# Patient Record
Sex: MALE | Race: WHITE | NOT HISPANIC OR LATINO | Employment: FULL TIME | URBAN - METROPOLITAN AREA
[De-identification: names, ages, dates, MRNs, and addresses within clinical notes are randomized per-mention and may not be internally consistent; named-entity substitution may affect disease eponyms.]

---

## 2020-08-24 ENCOUNTER — OFFICE VISIT (OUTPATIENT)
Dept: DERMATOLOGY | Facility: CLINIC | Age: 65
End: 2020-08-24
Payer: MEDICARE

## 2020-08-24 DIAGNOSIS — L91.8 SKIN TAG: ICD-10-CM

## 2020-08-24 DIAGNOSIS — H01.136 ECZEMA OF LEFT EYELID: ICD-10-CM

## 2020-08-24 DIAGNOSIS — L82.1 SK (SEBORRHEIC KERATOSIS): ICD-10-CM

## 2020-08-24 DIAGNOSIS — L57.0 AK (ACTINIC KERATOSIS): Primary | ICD-10-CM

## 2020-08-24 PROCEDURE — 99202 PR OFFICE/OUTPT VISIT, NEW, LEVL II, 15-29 MIN: ICD-10-PCS | Mod: 25,S$GLB,, | Performed by: DERMATOLOGY

## 2020-08-24 PROCEDURE — 99999 PR PBB SHADOW E&M-NEW PATIENT-LVL III: CPT | Mod: PBBFAC,,, | Performed by: DERMATOLOGY

## 2020-08-24 PROCEDURE — 17004 DESTROY PREMAL LESIONS 15/>: CPT | Mod: S$GLB,,, | Performed by: DERMATOLOGY

## 2020-08-24 PROCEDURE — 1101F PR PT FALLS ASSESS DOC 0-1 FALLS W/OUT INJ PAST YR: ICD-10-PCS | Mod: CPTII,S$GLB,, | Performed by: DERMATOLOGY

## 2020-08-24 PROCEDURE — 99202 OFFICE O/P NEW SF 15 MIN: CPT | Mod: 25,S$GLB,, | Performed by: DERMATOLOGY

## 2020-08-24 PROCEDURE — 17004 PR DESTRUCTION, PREMALIGNANT LESIONS; 15 OR MORE LESIONS: ICD-10-PCS | Mod: S$GLB,,, | Performed by: DERMATOLOGY

## 2020-08-24 PROCEDURE — 1159F PR MEDICATION LIST DOCUMENTED IN MEDICAL RECORD: ICD-10-PCS | Mod: S$GLB,,, | Performed by: DERMATOLOGY

## 2020-08-24 PROCEDURE — 1126F PR PAIN SEVERITY QUANTIFIED, NO PAIN PRESENT: ICD-10-PCS | Mod: S$GLB,,, | Performed by: DERMATOLOGY

## 2020-08-24 PROCEDURE — 1126F AMNT PAIN NOTED NONE PRSNT: CPT | Mod: S$GLB,,, | Performed by: DERMATOLOGY

## 2020-08-24 PROCEDURE — 1159F MED LIST DOCD IN RCRD: CPT | Mod: S$GLB,,, | Performed by: DERMATOLOGY

## 2020-08-24 PROCEDURE — 99999 PR PBB SHADOW E&M-NEW PATIENT-LVL III: ICD-10-PCS | Mod: PBBFAC,,, | Performed by: DERMATOLOGY

## 2020-08-24 PROCEDURE — 1101F PT FALLS ASSESS-DOCD LE1/YR: CPT | Mod: CPTII,S$GLB,, | Performed by: DERMATOLOGY

## 2020-08-24 RX ORDER — PRAVASTATIN SODIUM 80 MG/1
TABLET ORAL
COMMUNITY
Start: 2020-06-04

## 2020-08-24 RX ORDER — ASPIRIN 81 MG/1
81 TABLET ORAL DAILY
COMMUNITY

## 2020-08-24 RX ORDER — METOPROLOL SUCCINATE 25 MG/1
TABLET, EXTENDED RELEASE ORAL
COMMUNITY
Start: 2020-06-04

## 2020-08-24 RX ORDER — LOSARTAN POTASSIUM 50 MG/1
TABLET ORAL
COMMUNITY
Start: 2020-07-24

## 2020-08-24 NOTE — PROGRESS NOTES
Subjective:       Patient ID:  Nixon Jose Jr. is a 64 y.o. male who presents for   Chief Complaint   Patient presents with    Skin Check     eye, x weeks, itchy, growing, tx OTC cream arms, x months, no healing, no tx     Dry spot under l eye for one month.  Suddenly appeared.    Also bump on the left arm for months.    Has dry spots on the arms for years.  No prev tx.      Review of Systems   Constitutional: Negative for fever, chills, fatigue and malaise.   HENT: Negative for congestion and sore throat.    Respiratory: Negative for cough and shortness of breath.    Skin: Positive for itching, rash and dry skin.        Objective:    Physical Exam   Constitutional: He appears well-developed and well-nourished. No distress.   Eyes: No conjunctival no injection.   Neurological: He is alert and oriented to person, place, and time. He is not disoriented.   Psychiatric: He has a normal mood and affect.   Skin:   Areas Examined (abnormalities noted in diagram):   Scalp / Hair Palpated and Inspected  Head / Face Inspection Performed  Neck Inspection Performed  RUE Inspected  LUE Inspection Performed                   Diagram Legend     Erythematous scaling macule/papule c/w actinic keratosis       Vascular papule c/w angioma      Pigmented verrucoid papule/plaque c/w seborrheic keratosis      Yellow umbilicated papule c/w sebaceous hyperplasia      Irregularly shaped tan macule c/w lentigo     1-2 mm smooth white papules consistent with Milia      Movable subcutaneous cyst with punctum c/w epidermal inclusion cyst      Subcutaneous movable cyst c/w pilar cyst      Firm pink to brown papule c/w dermatofibroma      Pedunculated fleshy papule(s) c/w skin tag(s)      Evenly pigmented macule c/w junctional nevus     Mildly variegated pigmented, slightly irregular-bordered macule c/w mildly atypical nevus      Flesh colored to evenly pigmented papule c/w intradermal nevus       Pink pearly papule/plaque c/w basal cell  carcinoma      Erythematous hyperkeratotic cursted plaque c/w SCC      Surgical scar with no sign of skin cancer recurrence      Open and closed comedones      Inflammatory papules and pustules      Verrucoid papule consistent consistent with wart     Erythematous eczematous patches and plaques     Dystrophic onycholytic nail with subungual debris c/w onychomycosis     Umbilicated papule    Erythematous-base heme-crusted tan verrucoid plaque consistent with inflamed seborrheic keratosis     Erythematous Silvery Scaling Plaque c/w Psoriasis     See annotation      Assessment / Plan:        AK (actinic keratosis)  Discussed all of the following:  Actinic keratosis is a skin growth caused by sun damage. These growths are not cancer, but they may develop into skin cancer (precancerous). Many people get these growths, especially as they age. This is because of cumulative sun exposure over years. Multiple growths are called actinic keratoses.    Brochure given for patient education.  Cryosurgery Procedure Note    Verbal consent from the patient is obtained including, but not limited to, risk of hypopigmentation/hyperpigmentation, scar, recurrence of lesion. The patient is aware of the precancerous quality and need for treatment of these lesions. Liquid nitrogen cryosurgery is applied to the 16 actinic keratoses, as detailed in the physical exam, to produce a freeze injury. The patient is aware that blisters may form and is instructed on wound care with gentle cleansing and use of vaseline ointment to keep moist until healed. The patient is supplied a handout on cryosurgery and is instructed to call if lesions do not completely resolve.    Eczema of left eyelid  Discussed with patient the etiology and pathogenesis of the disease or skin lesion(s) and possible treatments and aggravators.    Reviewed with patient different treatment options and associated risks.  Recommended the usage of over the counter hydrocortisone as  needed for itching.  Instructed patient to use petroleum jelly at least daily on affected areas.  No hot water bathing reviewed.  We will recheck the l lower eyelid at our follow up appointment.  Make sure not ak.      SK (seborrheic keratosis)  Discussed with patient the benign nature of these lesions and that no treatment is indicated.      Skin tag  Discussed with patient the benign nature of these lesions and that no treatment is indicated.               Follow up in about 6 weeks (around 10/5/2020).

## 2020-08-24 NOTE — PATIENT INSTRUCTIONS
Sun damage spots that can lead to skin cancer  OTC hydrocortisone under the eye twice a day under both eyes for 1 week then stop, use along with Vaseline twice a day. Continue the use of Vaseline twice daily.  Recheck arms in 6 wks.    CRYOSURGERY      Your doctor has used a method called cryosurgery to treat your skin condition. Cryosurgery refers to the use of very cold substances to treat a variety of skin conditions such as warts, pre-skin cancers, molluscum contagiosum, sun spots, and several benign growths. The substance we use in cryosurgery is liquid nitrogen and is so cold (-195 degrees Celsius) that is burns when administered.     Following treatment in the office, the skin may immediately burn and become red. You may find the area around the lesion is affected as well. It is sometimes necessary to treat not only the lesion, but a small area of the surrounding normal skin to achieve a good response.     A blister, and even a blood filled blister, may form after treatment.   This is a normal response. If the blister is painful, it is acceptable to sterilize a needle and with rubbing alcohol and gently pop the blister. It is important that you gently wash the area with soap and warm water as the blister fluid may contain wart virus if a wart was treated. Do no remove the roof of the blister.     The area treated can take anywhere from 1-3 weeks to heal. Healing time depends on the kind skin lesion treated, the location, and how aggressively the lesion was treated. It is recommended that the areas treated are covered with Vaseline or bacitracin ointment and a band-aid. If a band-aid is not practical, just ointment applied several times per day will do. Keeping these areas moist will speed the healing time.    Treatment with liquid nitrogen can leave a scar. In dark skin, it may be a light or dark scar, in light skin it may be a white or pink scar. These will generally fade with time, but may never go away  completely.     If you have any concerns after your treatment, please feel free to call the office.         [address], [address] [address]  Dept: [phone]

## 2020-10-05 ENCOUNTER — OFFICE VISIT (OUTPATIENT)
Dept: DERMATOLOGY | Facility: CLINIC | Age: 65
End: 2020-10-05
Payer: MEDICARE

## 2020-10-05 DIAGNOSIS — L82.1 SEBORRHEIC KERATOSES: ICD-10-CM

## 2020-10-05 DIAGNOSIS — L57.0 ACTINIC KERATOSES: Primary | ICD-10-CM

## 2020-10-05 DIAGNOSIS — D22.9 MULTIPLE BENIGN NEVI: ICD-10-CM

## 2020-10-05 PROCEDURE — 17003 DESTRUCTION, PREMALIGNANT LESIONS; SECOND THROUGH 14 LESIONS: ICD-10-PCS | Mod: S$GLB,,, | Performed by: DERMATOLOGY

## 2020-10-05 PROCEDURE — 1159F MED LIST DOCD IN RCRD: CPT | Mod: S$GLB,,, | Performed by: DERMATOLOGY

## 2020-10-05 PROCEDURE — 99213 OFFICE O/P EST LOW 20 MIN: CPT | Mod: 25,S$GLB,, | Performed by: DERMATOLOGY

## 2020-10-05 PROCEDURE — 1159F PR MEDICATION LIST DOCUMENTED IN MEDICAL RECORD: ICD-10-PCS | Mod: S$GLB,,, | Performed by: DERMATOLOGY

## 2020-10-05 PROCEDURE — 99213 PR OFFICE/OUTPT VISIT, EST, LEVL III, 20-29 MIN: ICD-10-PCS | Mod: 25,S$GLB,, | Performed by: DERMATOLOGY

## 2020-10-05 PROCEDURE — 99999 PR PBB SHADOW E&M-EST. PATIENT-LVL III: ICD-10-PCS | Mod: PBBFAC,,, | Performed by: DERMATOLOGY

## 2020-10-05 PROCEDURE — 17000 PR DESTRUCTION(LASER SURGERY,CRYOSURGERY,CHEMOSURGERY),PREMALIGNANT LESIONS,FIRST LESION: ICD-10-PCS | Mod: S$GLB,,, | Performed by: DERMATOLOGY

## 2020-10-05 PROCEDURE — 17003 DESTRUCT PREMALG LES 2-14: CPT | Mod: S$GLB,,, | Performed by: DERMATOLOGY

## 2020-10-05 PROCEDURE — 17000 DESTRUCT PREMALG LESION: CPT | Mod: S$GLB,,, | Performed by: DERMATOLOGY

## 2020-10-05 PROCEDURE — 1126F AMNT PAIN NOTED NONE PRSNT: CPT | Mod: S$GLB,,, | Performed by: DERMATOLOGY

## 2020-10-05 PROCEDURE — 1101F PT FALLS ASSESS-DOCD LE1/YR: CPT | Mod: CPTII,S$GLB,, | Performed by: DERMATOLOGY

## 2020-10-05 PROCEDURE — 99999 PR PBB SHADOW E&M-EST. PATIENT-LVL III: CPT | Mod: PBBFAC,,, | Performed by: DERMATOLOGY

## 2020-10-05 PROCEDURE — 1101F PR PT FALLS ASSESS DOC 0-1 FALLS W/OUT INJ PAST YR: ICD-10-PCS | Mod: CPTII,S$GLB,, | Performed by: DERMATOLOGY

## 2020-10-05 PROCEDURE — 1126F PR PAIN SEVERITY QUANTIFIED, NO PAIN PRESENT: ICD-10-PCS | Mod: S$GLB,,, | Performed by: DERMATOLOGY

## 2020-10-05 NOTE — PROGRESS NOTES
Subjective:       Patient ID:  Nixon Jose Jr. is a 64 y.o. male who presents for   Chief Complaint   Patient presents with    Skin Check     Under left eye/follow up AK's     LOV 8/24/2020 Dr. Robles    Patient here today for 6 weeks skin check after cryo of AK's on arms that are healed. He continues to c/o dry itchy spot under left eye, currently using cortisone ointment on this area.     Denies Phx of NMSC  Denies Fhx of MM.      Current Outpatient Medications:     aspirin (ECOTRIN) 81 MG EC tablet, Take 81 mg by mouth once daily., Disp: , Rfl:     losartan (COZAAR) 50 MG tablet, TK 1 T PO D, Disp: , Rfl:     metoprolol succinate (TOPROL-XL) 25 MG 24 hr tablet, TK 1 T PO D, Disp: , Rfl:     pravastatin (PRAVACHOL) 80 MG tablet, TK 1 T PO  HS, Disp: , Rfl:             Review of Systems   Constitutional: Negative for fever, chills, fatigue and malaise.   HENT: Negative for congestion and sore throat.    Respiratory: Negative for cough and shortness of breath.    Gastrointestinal: Negative for nausea and vomiting.   Skin: Positive for itching, rash, activity-related sunscreen use and wears hat. Negative for daily sunscreen use.        Objective:    Physical Exam   Constitutional: He appears well-developed and well-nourished. No distress.   Neurological: He is alert and oriented to person, place, and time. He is not disoriented.   Psychiatric: He has a normal mood and affect.   Skin:   Areas Examined (abnormalities noted in diagram):   Scalp / Hair Palpated and Inspected  Head / Face Inspection Performed  Neck Inspection Performed  RUE Inspected  LUE Inspection Performed                   Diagram Legend     Erythematous scaling macule/papule c/w actinic keratosis       Vascular papule c/w angioma      Pigmented verrucoid papule/plaque c/w seborrheic keratosis      Yellow umbilicated papule c/w sebaceous hyperplasia      Irregularly shaped tan macule c/w lentigo     1-2 mm smooth white papules consistent with  Milia      Movable subcutaneous cyst with punctum c/w epidermal inclusion cyst      Subcutaneous movable cyst c/w pilar cyst      Firm pink to brown papule c/w dermatofibroma      Pedunculated fleshy papule(s) c/w skin tag(s)      Evenly pigmented macule c/w junctional nevus     Mildly variegated pigmented, slightly irregular-bordered macule c/w mildly atypical nevus      Flesh colored to evenly pigmented papule c/w intradermal nevus       Pink pearly papule/plaque c/w basal cell carcinoma      Erythematous hyperkeratotic cursted plaque c/w SCC      Surgical scar with no sign of skin cancer recurrence      Open and closed comedones      Inflammatory papules and pustules      Verrucoid papule consistent consistent with wart     Erythematous eczematous patches and plaques     Dystrophic onycholytic nail with subungual debris c/w onychomycosis     Umbilicated papule    Erythematous-base heme-crusted tan verrucoid plaque consistent with inflamed seborrheic keratosis     Erythematous Silvery Scaling Plaque c/w Psoriasis     See annotation      Assessment / Plan:        Actinic keratoses  Cryosurgery Procedure Note    Verbal consent from the patient is obtained and the patient is aware of the precancerous quality and need for treatment of these lesions. Liquid nitrogen cryosurgery is applied to the 8 actinic keratoses, as detailed in the physical exam, to produce a freeze injury. The patient is aware that blisters may form and is instructed on wound care with gentle cleansing and use of vaseline ointment to keep moist until healed. The patient is supplied a handout on cryosurgery and is instructed to call if lesions do not completely resolve.    Seborrheic keratoses  These are benign inherited growths without a malignant potential. Reassurance given to patient. No treatment is necessary.     Multiple benign nevi  Monitor for new mole or moles that are becoming bigger, darker, irritated, or developing irregular borders.      Patient instructed in importance in daily sun protection of at least spf 30. Mineral sunscreen ingredients preferred (Zinc +/- Titanium).   Recommend Elta MD for daily use on face and neck.  Patient encouraged to wear hat for all outdoor exposure.   Also discussed sun avoidance and use of protective clothing.           Follow up in about 6 months (around 4/5/2021).

## 2020-10-05 NOTE — PATIENT INSTRUCTIONS

## 2024-07-25 ENCOUNTER — OFFICE VISIT (OUTPATIENT)
Dept: URGENT CARE | Facility: CLINIC | Age: 69
End: 2024-07-25
Payer: COMMERCIAL

## 2024-07-25 VITALS
TEMPERATURE: 98 F | BODY MASS INDEX: 29.32 KG/M2 | RESPIRATION RATE: 18 BRPM | SYSTOLIC BLOOD PRESSURE: 123 MMHG | WEIGHT: 216.5 LBS | HEART RATE: 76 BPM | HEIGHT: 72 IN | DIASTOLIC BLOOD PRESSURE: 77 MMHG | OXYGEN SATURATION: 96 %

## 2024-07-25 DIAGNOSIS — S46.912A STRAIN OF LEFT SHOULDER, INITIAL ENCOUNTER: ICD-10-CM

## 2024-07-25 DIAGNOSIS — Z02.6 ENCOUNTER RELATED TO WORKER'S COMPENSATION CLAIM: Primary | ICD-10-CM

## 2024-07-25 DIAGNOSIS — S46.911A STRAIN OF RIGHT SHOULDER, INITIAL ENCOUNTER: ICD-10-CM

## 2024-07-25 DIAGNOSIS — M25.512 ACUTE PAIN OF LEFT SHOULDER: ICD-10-CM

## 2024-07-25 DIAGNOSIS — M25.521 RIGHT ELBOW PAIN: ICD-10-CM

## 2024-07-25 DIAGNOSIS — S50.01XA CONTUSION OF RIGHT ELBOW, INITIAL ENCOUNTER: ICD-10-CM

## 2024-07-25 DIAGNOSIS — S83.91XA SPRAIN OF RIGHT KNEE, UNSPECIFIED LIGAMENT, INITIAL ENCOUNTER: ICD-10-CM

## 2024-07-25 DIAGNOSIS — M25.511 ACUTE PAIN OF RIGHT SHOULDER: ICD-10-CM

## 2024-07-25 DIAGNOSIS — M25.561 ACUTE PAIN OF RIGHT KNEE: ICD-10-CM

## 2024-07-25 LAB
CTP QC/QA: YES
POC 5 PANEL DRUG SCREEN: NEGATIVE

## 2024-07-25 PROCEDURE — 80305 DRUG TEST PRSMV DIR OPT OBS: CPT | Mod: S$GLB,,, | Performed by: NURSE PRACTITIONER

## 2024-07-25 PROCEDURE — 99214 OFFICE O/P EST MOD 30 MIN: CPT | Mod: S$GLB,,, | Performed by: NURSE PRACTITIONER

## 2024-07-25 RX ORDER — INSULIN GLARGINE 300 U/ML
INJECTION, SOLUTION SUBCUTANEOUS
COMMUNITY

## 2024-07-25 RX ORDER — METFORMIN HYDROCHLORIDE 500 MG/1
1000 TABLET, EXTENDED RELEASE ORAL
COMMUNITY
Start: 2024-05-09 | End: 2024-11-05

## 2024-07-25 RX ORDER — MELOXICAM 7.5 MG/1
7.5 TABLET ORAL DAILY
Qty: 5 TABLET | Refills: 0 | Status: SHIPPED | OUTPATIENT
Start: 2024-07-25 | End: 2024-07-30

## 2024-07-25 RX ORDER — LOSARTAN POTASSIUM 100 MG/1
100 TABLET ORAL
COMMUNITY

## 2024-07-25 RX ORDER — ROSUVASTATIN CALCIUM 40 MG/1
40 TABLET, COATED ORAL
COMMUNITY

## 2024-07-25 NOTE — LETTER
[facility] [place] [address], [address]  [place] [address]  Phone: [phone]  Fax: [phone]  [facility]: [id number]    Pt Name: Nixon Jose Jr.  Injury Date: 07/24/2024   Employee ID: [id number] Date of First Treatment: 07/25/2024   Company: [employer]      Appointment Time: 10:45 AM Arrived: 10:50 AM   Provider: Farooq Griffith NP Time Out: 1:07 PM     Office Treatment:   1. Encounter related to worker's compensation claim    2. Acute pain of right shoulder    3. Right elbow pain    4. Acute pain of right knee    5. Acute pain of left shoulder    6. Contusion of right elbow, initial encounter    7. Sprain of right knee, unspecified ligament, initial encounter    8. Strain of right shoulder, initial encounter    9. Strain of left shoulder, initial encounter      Medications Ordered This Encounter   Medications    meloxicam (MOBIC) 7.5 MG tablet           Patient Instructions:   Rest, Ice, Elevate. Medication as prescribed.     Restrictions:  Off work today and tomorrow.      Return Appointment: Return in 24hrs for repeat evaluation       JF

## 2024-07-25 NOTE — PROGRESS NOTES
Subjective:       Patient ID: Nixon Jose Jr. is a 68 y.o. male.    Vitals:  height is 6' (1.829 m) and weight is 98.2 kg (216 lb 7.9 oz). His oral temperature is 97.8 °F (36.6 °C). His blood pressure is 123/77 and his pulse is 76. His respiration is 18 and oxygen saturation is 96%.     Chief Complaint: Fall    Patient's place of employment -  [employer]  Patient's job title -   Date of injury - 10/24/2024 at about 10:00 AM  Body part injured including left or right - Right and left shoulder, right and left upper leg and chin  Injury Mechanism - fall  What they were doing when they got hurt - Walking  What they did immediately after - he had 2 coworkers help him off the ground.He went home after that.  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  Pain scale right now - 4/10 if he's not moving and a 7/10 if he moves his leg,  10/10 if he moves his right arm.    Patient reports that two days ago he was walking, tripped, and fell onto right knee and elbow. He reports that he struck his chin on his hand. He denies any LOC. He denies striking his head. He denies any neck pain. He is complaining of right/left shoulder pain, right knee pain, and right elbow pain. He has no obvious injuries other than some mild swelling and faint bruising to the right elbow.      Musculoskeletal:  Positive for pain and joint pain.           Objective:      Physical Exam   Constitutional: He is oriented to person, place, and time.  Non-toxic appearance. He does not appear ill. No distress. normal  HENT:   Head: Normocephalic and atraumatic.   Ears:   Right Ear: Tympanic membrane, external ear and ear canal normal.   Left Ear: Tympanic membrane, external ear and ear canal normal.   Nose: Nose normal.   Mouth/Throat: Mucous membranes are moist. Oropharynx is clear.   Eyes: Conjunctivae are normal. Pupils are equal, round, and reactive to light. Extraocular movement  intact   Neck: Neck supple. No neck rigidity present.   Cardiovascular: Normal rate, regular rhythm, normal heart sounds and normal pulses.   Pulmonary/Chest: Effort normal and breath sounds normal.   Abdominal: Normal appearance and bowel sounds are normal. He exhibits no distension and no mass. Soft. flat abdomen There is no abdominal tenderness. There is no guarding.   Musculoskeletal: Normal range of motion.         General: Swelling and tenderness present. Normal range of motion.      Cervical back: He exhibits tenderness.      Comments: Mild swelling/bruising/TTP right elbow. Pain to right elbow with flexion/extension. Pain to right knee with flexion/extension.   Neurological: no focal deficit. He is alert, oriented to person, place, and time and at baseline. He displays no weakness and normal reflexes. No cranial nerve deficit or sensory deficit.   Skin: Skin is warm, dry and not diaphoretic. Capillary refill takes less than 2 seconds. bruising         Comments: Faint bruising - left elbow.   Psychiatric: His behavior is normal. Mood, judgment and thought content normal.   Vitals reviewed.        Past medical history and current medications reviewed.     Results for orders placed or performed in visit on 07/25/24   Rapid 5 Panel   Result Value Ref Range    POC 5 Panel Drug Screen Negative Negative     Acceptable Yes     XR KNEE 3 VIEW RIGHT    Result Date: 7/25/2024  EXAMINATION: XR KNEE 3 VIEW RIGHT CLINICAL HISTORY: Fall pain in right knee TECHNIQUE: AP, lateral, and Merchant views of the right knee were performed. COMPARISON: None FINDINGS: There is no fracture or dislocation, but there is a small knee joint effusion.  There are degenerative changes particularly involving the patellofemoral joint.  There is circumferential subcutaneous edema.     Small knee joint effusion with no acute bony abnormality. Electronically signed by: Katelin Lu Date:    07/25/2024 Time:    12:48    XR  ELBOW COMPLETE 3 VIEW RIGHT    Result Date: 7/25/2024  EXAMINATION: XR ELBOW COMPLETE 3 VIEW RIGHT CLINICAL HISTORY: Fall.  Pain in right elbow TECHNIQUE: AP, lateral, and oblique views of the right elbow were performed. COMPARISON: None FINDINGS: There is no fracture, dislocation or joint effusion.  There is, however, subcutaneous edema along the posterior and medial aspect of the elbow.  There is a cluster of what appear to be calcifications in the soft tissues in expected location of the musculotendinous junction of the triceps.  These are less likely to be clustered foreign bodies in this patient with a history of trauma.     Subcutaneous edema, which may be posttraumatic or due to cellulitis.  No fracture.  Probable calcifications in the posterior soft tissues, less likely foreign bodies, please correlate with the mechanism of injury. Electronically signed by: Katelin Lu Date:    07/25/2024 Time:    12:46    XR SHOULDER COMPLETE 2 OR MORE VIEWS LEFT    Result Date: 7/25/2024  EXAMINATION: XR SHOULDER COMPLETE 2 OR MORE VIEWS LEFT CLINICAL HISTORY: Pain in left shoulder TECHNIQUE: Two or three views of the left shoulder were performed. COMPARISON: None FINDINGS: There is no fracture or dislocation at the acromioclavicular or glenohumeral joint.  There are mild degenerative changes at the AC joint, and there is degenerative irregularity of the greater tuberosity of the humerus as well.  The visualized left-sided ribs are intact.     No acute abnormality.  Degenerative changes. Electronically signed by: Katelin Lu Date:    07/25/2024 Time:    12:23    XR SHOULDER COMPLETE 2 OR MORE VIEWS RIGHT    Result Date: 7/25/2024  EXAMINATION: XR SHOULDER COMPLETE 2 OR MORE VIEWS RIGHT CLINICAL HISTORY: Pain in right shoulder TECHNIQUE: Two or three views of the right shoulder were performed. COMPARISON: None FINDINGS: There is no fracture or dislocation at the acromioclavicular or glenohumeral joint.   There are mild degenerative changes at the AC joint, also involving the greater tuberosity of the humerus.  The visualized right-sided ribs are intact.     No acute abnormality.  Degenerative changes. Electronically signed by: Katelin Lu Date:    07/25/2024 Time:    12:22       Assessment:           1. Encounter related to worker's compensation claim    2. Acute pain of right shoulder    3. Right elbow pain    4. Acute pain of right knee    5. Acute pain of left shoulder    6. Contusion of right elbow, initial encounter    7. Sprain of right knee, unspecified ligament, initial encounter    8. Strain of right shoulder, initial encounter    9. Strain of left shoulder, initial encounter              Plan:         Encounter related to worker's compensation claim  -     Rapid 5 Panel    Acute pain of right shoulder  -     XR SHOULDER COMPLETE 2 OR MORE VIEWS RIGHT; Future; Expected date: 07/25/2024    Right elbow pain  -     XR ELBOW COMPLETE 3 VIEW RIGHT; Future; Expected date: 07/25/2024    Acute pain of right knee  -     XR KNEE 3 VIEW RIGHT; Future; Expected date: 07/25/2024    Acute pain of left shoulder  -     XR SHOULDER COMPLETE 2 OR MORE VIEWS LEFT; Future; Expected date: 07/25/2024    Contusion of right elbow, initial encounter  -     meloxicam (MOBIC) 7.5 MG tablet; Take 1 tablet (7.5 mg total) by mouth once daily. for 5 days  Dispense: 5 tablet; Refill: 0    Sprain of right knee, unspecified ligament, initial encounter  -     meloxicam (MOBIC) 7.5 MG tablet; Take 1 tablet (7.5 mg total) by mouth once daily. for 5 days  Dispense: 5 tablet; Refill: 0    Strain of right shoulder, initial encounter  -     meloxicam (MOBIC) 7.5 MG tablet; Take 1 tablet (7.5 mg total) by mouth once daily. for 5 days  Dispense: 5 tablet; Refill: 0    Strain of left shoulder, initial encounter  -     meloxicam (MOBIC) 7.5 MG tablet; Take 1 tablet (7.5 mg total) by mouth once daily. for 5 days  Dispense: 5 tablet; Refill:  0         INSTRUCTIONS  Rest, Ice, Elevate. Medication as prescribed. Off work today and tomorrow. Return in 24hrs for repeat evaluation.

## 2024-07-26 ENCOUNTER — OFFICE VISIT (OUTPATIENT)
Dept: URGENT CARE | Facility: CLINIC | Age: 69
End: 2024-07-26
Payer: COMMERCIAL

## 2024-07-26 VITALS
HEART RATE: 70 BPM | DIASTOLIC BLOOD PRESSURE: 74 MMHG | HEIGHT: 72 IN | SYSTOLIC BLOOD PRESSURE: 115 MMHG | TEMPERATURE: 98 F | WEIGHT: 216.5 LBS | OXYGEN SATURATION: 95 % | BODY MASS INDEX: 29.32 KG/M2 | RESPIRATION RATE: 19 BRPM

## 2024-07-26 DIAGNOSIS — M70.21 OLECRANON BURSITIS OF RIGHT ELBOW: ICD-10-CM

## 2024-07-26 DIAGNOSIS — Z02.6 ENCOUNTER RELATED TO WORKER'S COMPENSATION CLAIM: Primary | ICD-10-CM

## 2024-07-26 DIAGNOSIS — M25.511 ACUTE PAIN OF BOTH SHOULDERS: ICD-10-CM

## 2024-07-26 DIAGNOSIS — M25.512 ACUTE PAIN OF BOTH SHOULDERS: ICD-10-CM

## 2024-07-26 DIAGNOSIS — M25.521 RIGHT ELBOW PAIN: ICD-10-CM

## 2024-07-26 PROCEDURE — 99213 OFFICE O/P EST LOW 20 MIN: CPT | Mod: S$GLB,,, | Performed by: NURSE PRACTITIONER

## 2024-07-26 RX ORDER — METHYLPREDNISOLONE 4 MG/1
TABLET ORAL
Qty: 21 EACH | Refills: 0 | Status: SHIPPED | OUTPATIENT
Start: 2024-07-26 | End: 2024-08-16

## 2024-07-26 NOTE — LETTER
[facility] - [place] [address], [address]  [place] [address]  Phone: [phone]  Fax: [phone]  [facility]: [id number]    Pt Name: Nixon Jose Jr.  Injury Date: 07/24/2024   Employee ID: [id number] Date of First Treatment: 07/25/2024   Company: [employer]      Appointment Time: 11:00 AM Arrived: 10:55 AM   Provider: MICHAEL Staton Time Out: 12:00 PM     Office Treatment:   1. Encounter related to worker's compensation claim    2. Olecranon bursitis of right elbow    3. Right elbow pain    4. Acute pain of both shoulders      Medications Ordered This Encounter   Medications    methylPREDNISolone (MEDROL DOSEPACK) 4 mg tablet             Patient Instructions: May take Tylenol PRN for if not allergic.     Recommend application of ice, rest, elevation, and compression for support.     Recommend wearing supportive brace or sling as directed to promote healing.    Follow-up with [facility] for further evaluation.         Restrictions: May return to work with limited use of right elbow as tolerated until follow up with [facility].             Return Appointment: None.      CF, BSRT(R)

## 2024-07-26 NOTE — PATIENT INSTRUCTIONS
May return to work with limited use of right elbow as tolerated until follow up with [facility].     May take Tylenol PRN for if not allergic.     Recommend application of ice, rest, elevation, and compression for support.     Recommend wearing supportive brace or sling as directed to promote healing.    Follow-up with [facility] for further evaluation.

## 2024-07-26 NOTE — LETTER
[facility] - [place] [address], [address]  [place] [address]  Phone: [phone]  Fax: [phone]  [facility]: [id number]    Pt Name: Nixon Jose Jr.  Injury Date: 07/24/2024   Employee ID:  Date of First Treatment: 07/26/2024   Company: [employer]      Appointment Time: 11:00 AM Arrived: 10:55 AM   Provider: MICHAEL Staton Time Out: 12:00 PM     Office Treatment:   1. Encounter related to worker's compensation claim    2. Olecranon bursitis of right elbow    3. Right elbow pain    4. Acute pain of both shoulders      Medications Ordered This Encounter   Medications    methylPREDNISolone (MEDROL DOSEPACK) 4 mg tablet            Patient Instructions: May take Tylenol PRN for if not allergic.     Recommend application of ice, rest, elevation, and compression for support.     Recommend wearing supportive brace or sling as directed to promote healing.    Follow-up with [facility] for further evaluation.         Restrictions: May return to work with limited use of right elbow as tolerated until follow up with [facility].      Return Appointment: None.      CF, BSRT(R)

## 2024-07-26 NOTE — PROGRESS NOTES
Subjective:       Patient ID: Nixon Jose Jr. is a 68 y.o. male.    Vitals:  height is 6' (1.829 m) and weight is 98.2 kg (216 lb 7.9 oz). His oral temperature is 97.6 °F (36.4 °C). His blood pressure is 115/74 and his pulse is 70. His respiration is 19 and oxygen saturation is 95%.     Chief Complaint: Follow-up    Patient's place of employment - [employer]  Patient's job title -   Date of Injury - 7/24/2024  Body part injured - right and left shoulder, right and left leg and chin  Current work status per last visit - Off work yesterday and today  Improved, same, or worse - right shoulder no better, but right and left leg, left shoulder and chin are a little better.  Pain Scale right now (1-10) -  right shoulder is 8/10, Everything else is a 7/10.      Patient here for follow up.  Patient recently had a fall at work on 07/24 24.  Patient was seen at the urgent care yesterday and had a series of x-rays completed of bilateral shoulders and right elbow that were negative for acute fractures.  Patient here today for follow up and reports that he is still experiencing pain to bilateral shoulders that is tolerable but is experiencing moderate pain to right elbow with swelling and pain.  He has also reporting and alteration range of motion to right elbow that is intermittent. Pt was treated with mobic daily at yesterday's visit.           Follow-up  The current episode started in the past 7 days. The problem has been gradually improving. Associated symptoms include arthralgias. Treatments tried: meloxicam. The treatment provided moderate relief.       Constitution: Negative.   Musculoskeletal:  Positive for joint pain.   Skin:  Positive for bruising.   Neurological:  Negative for disorientation and altered mental status.   Psychiatric/Behavioral:  Negative for altered mental status, disorientation and confusion.            Objective:      Physical Exam   Constitutional: He is oriented to  person, place, and time. He appears well-developed. He is cooperative.  Non-toxic appearance. He does not appear ill. No distress.   HENT:   Head: Normocephalic and atraumatic.   Ears:   Right Ear: Hearing, tympanic membrane, external ear and ear canal normal.   Left Ear: Hearing, tympanic membrane, external ear and ear canal normal.   Nose: Nose normal. No mucosal edema or nasal deformity. No epistaxis. Right sinus exhibits no maxillary sinus tenderness and no frontal sinus tenderness. Left sinus exhibits no maxillary sinus tenderness and no frontal sinus tenderness.   Mouth/Throat: Uvula is midline, oropharynx is clear and moist and mucous membranes are normal. No trismus in the jaw. Normal dentition. No uvula swelling.   Eyes: Conjunctivae and lids are normal.   Neck: Trachea normal and phonation normal. Neck supple.   Cardiovascular: Normal rate, regular rhythm, normal heart sounds and normal pulses.   Pulmonary/Chest: Effort normal and breath sounds normal.   Abdominal: Normal appearance and bowel sounds are normal. Soft.   Musculoskeletal:      Right shoulder: He exhibits tenderness (mild). He exhibits normal range of motion (pt reports increased pain with ROM).      Left shoulder: He exhibits tenderness (mild). He exhibits normal range of motion.      Right elbow: He exhibits decreased range of motion (Patient reports that pain increases with range of motion.  Patient also reports mild laxity in flexion and extension at times) and swelling (with erythema and mild violaceous ecchymosis noted surrounding posterior elbow.  Patient also has mild erythema and warmth surrounding the elbow area.). He exhibits no laceration. Tenderness (Patient reports moderate generalized pain to right elbow.) found.   Neurological: He is alert and oriented to person, place, and time. He exhibits normal muscle tone.   Skin: Skin is warm, dry, intact and not diaphoretic.   Psychiatric: His speech is normal and behavior is normal.  Judgment and thought content normal.   Nursing note and vitals reviewed.        Past medical history and current medications reviewed.       Assessment:           1. Encounter related to worker's compensation claim    2. Olecranon bursitis of right elbow    3. Right elbow pain    4. Acute pain of both shoulders              Plan:         Encounter related to worker's compensation claim    Olecranon bursitis of right elbow  -     Ambulatory referral/consult to Occupational Medicine  -     Cancel: SLING FOR HOME USE  -     methylPREDNISolone (MEDROL DOSEPACK) 4 mg tablet; use as directed  Dispense: 21 each; Refill: 0  -     IMMOBILIZER FOR HOME USE    Right elbow pain  -     Ambulatory referral/consult to Occupational Medicine  -     Cancel: SLING FOR HOME USE    Acute pain of both shoulders  -     Ambulatory referral/consult to Occupational Medicine    Wrapped right elbow with ACE wrap for support and compression and provided pt with arm immobilizer for support. Pt will follow up with [facility] for further evaluation.          Patient Instructions   May return to work with limited use of right elbow as tolerated until follow up with [facility].     May take Tylenol PRN for if not allergic.     Recommend application of ice, rest, elevation, and compression for support.     Recommend wearing supportive brace or sling as directed to promote healing.    Follow-up with [facility] for further evaluation.            Richard Sherman, ANTONINAP-C   Medical Decision Making:   Other:   I have discussed this case with another health care provider.       <> Summary of the Discussion: I discussed this case with Dr Beck. She recommends that the symptoms of redness, swelling, pain, and laxity with ROM to right elbow are most likely related to traumatic olecranon bursitis related to the fall. She recommended medrol dose pack and compression to the area along with  Tylenol for pain relief and a follow up with Occupational health clinic for further evaluation to ensure resolution of symptoms.

## 2024-08-05 ENCOUNTER — OFFICE VISIT (OUTPATIENT)
Dept: URGENT CARE | Facility: CLINIC | Age: 69
End: 2024-08-05
Payer: COMMERCIAL

## 2024-08-05 VITALS
HEART RATE: 67 BPM | TEMPERATURE: 99 F | SYSTOLIC BLOOD PRESSURE: 125 MMHG | OXYGEN SATURATION: 95 % | RESPIRATION RATE: 16 BRPM | BODY MASS INDEX: 29.26 KG/M2 | DIASTOLIC BLOOD PRESSURE: 75 MMHG | HEIGHT: 72 IN | WEIGHT: 216 LBS

## 2024-08-05 DIAGNOSIS — M23.303 DERANGEMENT OF MEDIAL MENISCUS OF RIGHT KNEE: ICD-10-CM

## 2024-08-05 DIAGNOSIS — M67.912 DISORDER OF ROTATOR CUFF OF BOTH SHOULDERS: ICD-10-CM

## 2024-08-05 DIAGNOSIS — Z02.6 ENCOUNTER RELATED TO WORKER'S COMPENSATION CLAIM: Primary | ICD-10-CM

## 2024-08-05 DIAGNOSIS — M67.911 DISORDER OF ROTATOR CUFF OF BOTH SHOULDERS: ICD-10-CM

## 2024-08-05 PROCEDURE — 73562 X-RAY EXAM OF KNEE 3: CPT | Mod: RT,S$GLB,, | Performed by: RADIOLOGY

## 2024-08-05 PROCEDURE — 99214 OFFICE O/P EST MOD 30 MIN: CPT | Mod: ,,, | Performed by: FAMILY MEDICINE
